# Patient Record
Sex: MALE | Race: WHITE | NOT HISPANIC OR LATINO | Employment: FULL TIME | URBAN - METROPOLITAN AREA
[De-identification: names, ages, dates, MRNs, and addresses within clinical notes are randomized per-mention and may not be internally consistent; named-entity substitution may affect disease eponyms.]

---

## 2024-05-15 ENCOUNTER — APPOINTMENT (OUTPATIENT)
Dept: RADIOLOGY | Facility: CLINIC | Age: 64
End: 2024-05-15
Payer: COMMERCIAL

## 2024-05-15 VITALS
BODY MASS INDEX: 36.12 KG/M2 | WEIGHT: 258 LBS | HEART RATE: 86 BPM | DIASTOLIC BLOOD PRESSURE: 84 MMHG | HEIGHT: 71 IN | SYSTOLIC BLOOD PRESSURE: 120 MMHG

## 2024-05-15 DIAGNOSIS — G89.29 CHRONIC PAIN OF BOTH KNEES: ICD-10-CM

## 2024-05-15 DIAGNOSIS — M25.561 CHRONIC PAIN OF BOTH KNEES: ICD-10-CM

## 2024-05-15 DIAGNOSIS — M25.562 CHRONIC PAIN OF BOTH KNEES: ICD-10-CM

## 2024-05-15 DIAGNOSIS — M17.0 PRIMARY OSTEOARTHRITIS OF BOTH KNEES: Primary | ICD-10-CM

## 2024-05-15 DIAGNOSIS — Z86.39 HISTORY OF DIABETES MELLITUS: ICD-10-CM

## 2024-05-15 PROCEDURE — 99204 OFFICE O/P NEW MOD 45 MIN: CPT | Performed by: ORTHOPAEDIC SURGERY

## 2024-05-15 PROCEDURE — 73562 X-RAY EXAM OF KNEE 3: CPT

## 2024-05-15 RX ORDER — LISINOPRIL AND HYDROCHLOROTHIAZIDE 20; 12.5 MG/1; MG/1
TABLET ORAL
COMMUNITY
Start: 2024-03-11

## 2024-05-15 RX ORDER — AMOXICILLIN AND CLAVULANATE POTASSIUM 875; 125 MG/1; MG/1
TABLET, FILM COATED ORAL
COMMUNITY
Start: 2024-05-14

## 2024-05-15 RX ORDER — AMLODIPINE BESYLATE 10 MG/1
TABLET ORAL
COMMUNITY
Start: 2024-03-11

## 2024-05-15 RX ORDER — METOPROLOL SUCCINATE 100 MG/1
TABLET, EXTENDED RELEASE ORAL
COMMUNITY
Start: 2024-03-11

## 2024-05-15 NOTE — PROGRESS NOTES
Assessment/Plan:  1. Primary osteoarthritis of both knees        2. Chronic pain of both knees  XR knee 3 vw left non injury    XR knee 3 vw right non injury      3. History of diabetes mellitus          Scribe Attestation      I,:  Jayden Hernandez am acting as a scribe while in the presence of the attending physician.:       I,:  Jasper De Dios, DO personally performed the services described in this documentation    as scribed in my presence.:           Manuel is a pleasant 64-year-old gentleman who presents today for initial evaluation of his bilateral knee pain.  After reviewing his imaging and performing a thorough history and physical exam I explained that he is symptomatic of his severe underlying osteoarthritis.  We discussed treatment options today and I explained that we are currently limited in this regard.  I recommended avoiding NSAIDs as this has caused GI discomfort for him in the past.  I also recommended against corticosteroid injection therapy as this appears to have caused an allergic reaction for him in the past.  Based on the progression of his disease, he would benefit from total knee arthroplasty and we spent time discussing this today.  He would be a good candidate for same-day procedure.  We also discussed initiating viscosupplementation injection therapy.  He plans to take time to consider these options and will call the office if he would like to pursue injection therapy.  He will return to the office for preoperative examination and surgical consent for robotic assisted left total knee arthroplasty if he wishes to pursue surgery.  All of his questions and concerns were addressed today.    Subjective: Initial evaluation for bilateral knee pain    Patient ID: Manuel Devries is a 64 y.o. male who presents today for initial evaluation of his bilateral knee pain.  His knees have been intermittently painful for the last few decades but his symptoms have been increasing in frequency and intensity  "over the last few years.  He describes diffuse aching pain about both of his knees that is currently slightly more pronounced on the left over the right.  His pain increases with all activity, particular when navigating stairs.  He uses a cane for ambulatory assistance.  He does have a history of corticosteroid injection therapy in the right knee approximately 5 years ago.  This caused an uncomfortable reaction including swelling and redness.  More recently, he has been using Tylenol which does provide some benefit for him.  He also reports his right knee \"nicole\" at times.  He denies any recent injury or trauma.  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    Review of Systems   Constitutional:  Positive for activity change. Negative for chills, fever and unexpected weight change.   HENT:  Negative for hearing loss, nosebleeds and sore throat.    Eyes:  Negative for pain, redness and visual disturbance.   Respiratory:  Negative for cough, shortness of breath and wheezing.    Cardiovascular:  Negative for chest pain, palpitations and leg swelling.   Gastrointestinal:  Negative for abdominal pain, nausea and vomiting.   Endocrine: Negative for polyphagia and polyuria.   Genitourinary:  Negative for dysuria and hematuria.   Musculoskeletal:  Positive for arthralgias and myalgias. Negative for joint swelling.        See HPI   Skin:  Negative for rash and wound.   Neurological:  Negative for dizziness, numbness and headaches.   Psychiatric/Behavioral:  Negative for decreased concentration and suicidal ideas. The patient is not nervous/anxious.          Past Medical History:   Diagnosis Date    Diabetes mellitus (HCC)     Hypertension        Past Surgical History:   Procedure Laterality Date    HERNIA REPAIR      KNEE ARTHROSCOPY Left     WISDOM TOOTH EXTRACTION         History reviewed. No pertinent family history.    Social History     Occupational History    Not on file   Tobacco Use    Smoking status: " Never    Smokeless tobacco: Never   Vaping Use    Vaping status: Not on file   Substance and Sexual Activity    Alcohol use: Yes     Comment: occ.    Drug use: Not on file    Sexual activity: Never         Current Outpatient Medications:     amLODIPine (NORVASC) 10 mg tablet, , Disp: , Rfl:     amoxicillin-clavulanate (AUGMENTIN) 875-125 mg per tablet, , Disp: , Rfl:     lisinopril-hydrochlorothiazide (PRINZIDE,ZESTORETIC) 20-12.5 MG per tablet, , Disp: , Rfl:     metFORMIN (GLUCOPHAGE) 500 mg tablet, , Disp: , Rfl:     metoprolol succinate (TOPROL-XL) 100 mg 24 hr tablet, , Disp: , Rfl:     No Known Allergies    Objective:  Vitals:    05/15/24 1252   BP: 120/84   Pulse: 86       Body mass index is 35.98 kg/m².    Right Knee Exam     Tenderness   The patient is experiencing tenderness in the medial joint line and patella.    Range of Motion   Extension:  0   Flexion:  130     Tests   Varus: negative Valgus: negative  Drawer:  Anterior - negative    Posterior - negative    Other   Erythema: absent  Scars: absent  Sensation: normal  Pulse: present  Swelling: none  Effusion: effusion (scant) present    Comments:  Stable at 0, 30 and 90 degrees  Neurovascularly in tact distally  No warmth or erythema  Parapatellar crepitance noted  Patellofemoral grind: equivocal      Left Knee Exam     Tenderness   The patient is experiencing tenderness in the medial joint line and patella.    Range of Motion   Extension:  0   Flexion:  120     Tests   Varus: negative Valgus: negative  Drawer:  Anterior - negative     Posterior - negative    Other   Erythema: absent  Scars: absent  Sensation: normal  Pulse: present  Swelling: none  Effusion: effusion (scant) present    Comments:  Stable at 0, 30 and 90 degrees  Neurovascularly in tact distally  No warmth or erythema  Parapatellar crepitance noted  Patellofemoral grind: equivocal          Observations   Left Knee   Positive for effusion (scant).     Right Knee   Positive for effusion  (scant).       Physical Exam  Vitals and nursing note reviewed.   Constitutional:       Appearance: Normal appearance. He is well-developed.   HENT:      Head: Normocephalic and atraumatic.      Right Ear: External ear normal.      Left Ear: External ear normal.      Nose: Nose normal.   Eyes:      General: No scleral icterus.     Extraocular Movements: Extraocular movements intact.      Conjunctiva/sclera: Conjunctivae normal.   Cardiovascular:      Rate and Rhythm: Normal rate.   Pulmonary:      Effort: Pulmonary effort is normal. No respiratory distress.   Musculoskeletal:      Cervical back: Normal range of motion and neck supple.      Right knee: Effusion (scant) present.      Left knee: Effusion (scant) present.      Comments: See Ortho exam   Skin:     General: Skin is warm and dry.   Neurological:      General: No focal deficit present.      Mental Status: He is alert and oriented to person, place, and time.   Psychiatric:         Behavior: Behavior normal.         I have personally reviewed pertinent films in PACS.    X-ray of the bilateral knees obtained on 5/15/2024 reviewed demonstrating severe degenerative change and varus pattern with bone-on-bone contact medially.  There is tricompartmental sclerosis and osteophytosis.  There is no acute fracture, dislocation, lytic or blastic lesion.    This document was created using speech voice recognition software.   Grammatical errors, random word insertions, pronoun errors, and incomplete sentences are an occasional consequence of this system due to software limitations, ambient noise, and hardware issues.   Any formal questions or concerns about content, text, or information contained within the body of this dictation should be directly addressed to the provider for clarification.